# Patient Record
Sex: MALE | Race: WHITE | NOT HISPANIC OR LATINO | URBAN - METROPOLITAN AREA
[De-identification: names, ages, dates, MRNs, and addresses within clinical notes are randomized per-mention and may not be internally consistent; named-entity substitution may affect disease eponyms.]

---

## 2018-05-23 ENCOUNTER — EMERGENCY (EMERGENCY)
Facility: HOSPITAL | Age: 73
LOS: 1 days | Discharge: HOME | End: 2018-05-23
Attending: EMERGENCY MEDICINE
Payer: MEDICARE

## 2018-05-23 VITALS
HEART RATE: 102 BPM | DIASTOLIC BLOOD PRESSURE: 78 MMHG | SYSTOLIC BLOOD PRESSURE: 134 MMHG | TEMPERATURE: 99 F | OXYGEN SATURATION: 95 % | WEIGHT: 156.09 LBS | HEIGHT: 68 IN | RESPIRATION RATE: 16 BRPM

## 2018-05-23 PROCEDURE — 99282 EMERGENCY DEPT VISIT SF MDM: CPT

## 2018-05-23 PROCEDURE — 99283 EMERGENCY DEPT VISIT LOW MDM: CPT

## 2018-05-23 NOTE — ED PROVIDER NOTE - OBJECTIVE STATEMENT
72 y/o M w/ no PMhx presents was BIB EMS to the ED. Went shopping today on [address] and began feeling tired and laid down in the grass outside of store in order to rest. People in the store saw and thought pt passed out and called EMS. Denies all these complaints; states that he was just a little tired. Denies CP, SOB, emesis, LOC, head trauma, injury, and any other complaints. NKDA. 72 y/o M w/ Parkinson presents was BIB EMS to the ED. Went shopping today on [address] and began feeling tired and laid down in the grass outside of store in order to rest. People in the store saw and thought pt passed out and called EMS. Denies all these complaints; states that he was just a little tired. Denies CP, SOB, emesis, LOC, head trauma, injury, and any other complaints. NKDA.

## 2018-05-23 NOTE — ED ADULT NURSE NOTE - OBJECTIVE STATEMENT
pt sent from assisted living fall. patient denies fall, stated was sitting on grass, pt vital signs stable

## 2018-05-23 NOTE — ED PROVIDER NOTE - MEDICAL DECISION MAKING DETAILS
Pt brought in by EMS. Given no findings of injury or complaints and given that pt is very good historian will d/c pt w/ no imaging or blood work.

## 2018-05-23 NOTE — ED PROVIDER NOTE - NEUROLOGICAL, MLM
Alert and oriented, no focal deficits, no motor or sensory deficits. Wide based gait w/ slow pace (pt states this is his normal gait which is easier when he uses a walker)